# Patient Record
Sex: FEMALE | Race: WHITE | NOT HISPANIC OR LATINO | Employment: FULL TIME | URBAN - METROPOLITAN AREA
[De-identification: names, ages, dates, MRNs, and addresses within clinical notes are randomized per-mention and may not be internally consistent; named-entity substitution may affect disease eponyms.]

---

## 2021-11-23 ENCOUNTER — OFFICE VISIT (OUTPATIENT)
Dept: NEUROLOGY | Facility: CLINIC | Age: 41
End: 2021-11-23
Payer: COMMERCIAL

## 2021-11-23 VITALS
DIASTOLIC BLOOD PRESSURE: 105 MMHG | TEMPERATURE: 98 F | SYSTOLIC BLOOD PRESSURE: 155 MMHG | RESPIRATION RATE: 17 BRPM | HEIGHT: 63 IN | WEIGHT: 179.44 LBS | HEART RATE: 77 BPM | BODY MASS INDEX: 31.79 KG/M2

## 2021-11-23 DIAGNOSIS — M79.18 MYOFASCIAL PAIN: ICD-10-CM

## 2021-11-23 DIAGNOSIS — G50.0 TRIGEMINAL NEURALGIA OF LEFT SIDE OF FACE: Primary | ICD-10-CM

## 2021-11-23 DIAGNOSIS — R20.2 LEFT HAND PARESTHESIA: ICD-10-CM

## 2021-11-23 DIAGNOSIS — G43.009 MIGRAINE WITHOUT AURA AND WITHOUT STATUS MIGRAINOSUS, NOT INTRACTABLE: ICD-10-CM

## 2021-11-23 DIAGNOSIS — G44.041 INTRACTABLE CHRONIC PAROXYSMAL HEMICRANIA: ICD-10-CM

## 2021-11-23 PROCEDURE — 99999 PR PBB SHADOW E&M-NEW PATIENT-LVL IV: CPT | Mod: PBBFAC,,, | Performed by: PHYSICIAN ASSISTANT

## 2021-11-23 PROCEDURE — 99205 PR OFFICE/OUTPT VISIT, NEW, LEVL V, 60-74 MIN: ICD-10-PCS | Mod: S$GLB,,, | Performed by: PHYSICIAN ASSISTANT

## 2021-11-23 PROCEDURE — 99999 PR PBB SHADOW E&M-NEW PATIENT-LVL IV: ICD-10-PCS | Mod: PBBFAC,,, | Performed by: PHYSICIAN ASSISTANT

## 2021-11-23 PROCEDURE — 99205 OFFICE O/P NEW HI 60 MIN: CPT | Mod: S$GLB,,, | Performed by: PHYSICIAN ASSISTANT

## 2021-11-23 RX ORDER — CARBAMAZEPINE 200 MG/1
200 TABLET ORAL 2 TIMES DAILY
COMMUNITY
Start: 2021-11-15

## 2021-11-23 RX ORDER — IBUPROFEN 600 MG/1
600 TABLET ORAL
COMMUNITY

## 2021-11-23 RX ORDER — FLUTICASONE PROPIONATE 50 MCG
1 SPRAY, SUSPENSION (ML) NASAL
COMMUNITY

## 2021-11-23 RX ORDER — GALCANEZUMAB 120 MG/ML
INJECTION, SOLUTION SUBCUTANEOUS
COMMUNITY
Start: 2021-06-09 | End: 2022-01-11 | Stop reason: ALTCHOICE

## 2021-11-23 RX ORDER — INDOMETHACIN 25 MG/1
25 CAPSULE ORAL 3 TIMES DAILY
Qty: 90 CAPSULE | Refills: 4 | Status: SHIPPED | OUTPATIENT
Start: 2021-11-23 | End: 2022-01-11 | Stop reason: SDUPTHER

## 2022-01-10 NOTE — PROGRESS NOTES
[facility]  [address] [facility] [address]  [place] [address]  Phone:[phone]  Fax: [phone]  Follow up visit     Patient Name: Mira Cantu  : 1980  MRN:  [id number]  Today: 1/10/2022   LOV: 2021  chief complaint: Headache    PCP: Michoacano Flores DO.    Assessment:   Mira Cantu is a 41 y.o.  with a PMHx of: HTN, neck pain (has had interventional pain management treatment in past), Trigeminal neuralgia, asthma, and migraines whom presents solo at the request of the PCP for HA/TN.     HA appear to be mixed trigeminal neuralgia and (based on Unilateral symptoms on LEFT), also appear to be hemicrania continua ( her HA have improved with indocin---however--did not fully take away her HA--also been on steroids d/t recurrent infections---unclear if this also factored into her HA complaints?). Will give indocin more time to work, however, appears to have had a positive effect on her complaints, thus lending diagnosis of hemicrania continua. Ultimately would like to see if we can try other alternatives as opposed to chronic indocin use (she agreed).  Will reassess and see if trend prevails once the steroids are completed. As she is not having side effects (GI/) will continue indocin.          -regarding her TN symptoms, she is unclear if she has ever had vascular imaging (I.e., to look for artery touching the CN V). She would like to hold off and try medicines and if continued concern, consider getting MRA. <---historically (states today there was improvement).     Neck pain persists... no reports of sensory change in LUE at today's visit.          Review:    ICD-10-CM ICD-9-CM   1. Intractable chronic paroxysmal hemicrania  G44.041 339.04   2. Trigeminal neuralgia of left side of face  G50.0 350.1   3. Hemicrania continua  G44.51 339.41         Plan:               -will hold off on imaging for right now, but may benefit from MRA head (possibly MRI Brain) and  "MRI C spine     For HA Prevention:  1 emgality was self stopped, may revisit in future  2 continue tegretol   3 continue indocin for now, TID, refills given   3 we discussed botox at length, for now, would like to hold off, she will let us know if she would like to retry (only tried one session "years ago" and had "aches after receiving")  4 added pamelor, discussed adv effects/dosing, self titration to 20 mg Q2h before bed over next few weeks, she voiced understanding and would like to move forward.                 All test results as well as any necessary instructions were reviewed and discussed with patient.    Review:  Orders Placed This Encounter    nortriptyline (PAMELOR) 10 MG capsule    indomethacin (INDOCIN) 25 MG capsule         Patient to return to PCP/other specialists for all other problems  Patient to continue on all medications as Rx'd   A detailed AVS was provided to the patient with patient readback   RTO- 2 months to check in   The patient indicates understanding of these issues and agrees to the plan.    HPI:   Mira Cantu is a 41 y.o.right handed, female with a PMHx of: HTN, neck pain (has had interventional pain management treatment in past), Trigeminal neuralgia, asthma, and migraines whom presents solo in follow up for HA/TN.     At last visit:She was to continue emgality, tegretol and try indocin.    Has had off/on infections for past 2 months, treated with ABX and about to finish steroids   The indocin worked, no longer having pain, able to move her eye brows more   Pain on LEFT side of head  "Much more manageable" with indocin  No side effects   Pain still 3-4/10   " a few migraines since last seen"  Overall doing a lot better   Currently on prilosec from PCP to protect her stomach   No other new concerns   When asked, sleeps well   Has been off emgality for a few months now, Rx seems to have lapsed       -she seemed to think it helped her HA, but ultimately, the best help has " "been indocin.   Still having some neck/shoulder pain, doesn't mention any sensory changes in her hands at today's appt       HA historically:   Start:pain since 2008, tegretol was helping (after multiple different medicines/different providers trying)  History of trauma (no), History of CNS infection (no), History of Stroke (no)  Location:LEFT frontalis/LEFT side of head/LEFT ear, sometimes to the LEFT side of the mouth, LEFT side of the nose            Migraines: same region (on the LEFT), climb from the back of the head on the LEFT, and goes up/around to the back of the LEFT eye (eye red on the LEFT, calhoun constantly, nasal dripping, lasts many hours and can last for days--al on the LEFT)                -HA "Better" since being on emgality, still having some facial pain daily   Severity: range: 4-10/10  Duration:all day long   Frequency:30/30 HD  How many HA types do you have (?):  Associated factors (bolded positive) WITH HA ( or migraine): Nausea, vomiting, photophobia, phonophobia, tinnitus, scalp pain, vision loss, diplopia, scintillations, eye pain, jaw pain, weakness?    Tried:tegretol, emgality---PCP has switched to trileptal, "I was on this a few days, and had to get off of it as I felt my pain was so much worse!"   Triggers (in bold): stress, lack of sleep, too much caffeine, too little caffeine, chewing, swallowing, gentle touch, cold air, wind touching face, talking does make pain worse,  weather change, seasonal change, strong odours, bright lights, sunlight, food    Last HA: has one presently   Positives in bold: Hx of Kidney Stones, asthma, GI bleed, osteoporosis, CAD/MI, CVA/TIA, DM    Imaging on file: none in [facility], from chart review, imaging studies were reported as normal-(patient states Brain and C spine both reported as normal, though been many years)  Therapies tried in past: (failures to be marked, if known---why did it fail?)  emgality  Trileptal  Tegretol " "  topamax  Botox  Neurontin  Lyrica  oxycodone   percocet  trigger point injections  Indocin       Dr Dale's notes (4/2/20-Care everywhere):  "Patient ID: Mira Cantu is a 39 y.o. female.  The patient is here for follow-up after her initial visit in July 2019. She was supposed to return in 3 months. Her appointments have also been delayed in the past. She is still taking low-dose Tegretol. Liver functions, sodium level, white blood cell count and carbamazepine level are unremarkable, when last checked in December. Previous visits we discussed her MRI results. She had unremarkable brain MRI in November 2008 and April 2017. She also had cervical MRIs done in November 2008 in January 2011, which showed some mild degenerative changes. She is been doing well. She likes the Emgality for chronic migraines. However, she has had issues getting it with her insurance company, despite filling out significant amounts of paperwork. She has failed Topamax in the past.    Patient was originally referred by her otolaryngologist for neurologic consultation regarding trigeminal neuralgia. Approximately 10 years ago, the patient had sudden onset of left-sided facial pain. She said the pain was so severe that she was hospitalized for approximately a week. Testing at that time was unremarkable. Over a period of approximately 3 years, she saw numerous physicians in [place], [place] and [place]. She was treated with numerous medications including Botox, Neurontin, Lyrica, oxycodone and trigger point injections. At this point, she is on carbamazepine 200 mg twice daily. She does not want to take narcotics because they did not seem to help. She has been off of those for about a year and a half. Spinal stimulator was suggested by one individual but not felt to be needed by another. She has had neck MRIs and brain MRIs. She believes her most recent brain MRI was in 2017. She is not sure of the neck MRI day. She does describe " "some constant pain involving the left side the face. This also goes down to the neck. She feels like her left hand is weak and she drops things. There is numbness of all fingers on the left hand. It does not affect the leg. She has never had an EMG nerve conduction study.   HPI  Patient's medications, allergies, past medical, surgical, social and family histories were reviewed and updated as appropriate. "      As aside, long standing numbness in the LUE manus, at times now dropping things (commented by previous neurologist). Denies incontinence, falls. States she has a lot of neck pain. "I have had imaging studies and blocks from pain management, nothing seems to have worked."       Medication Reconciliation:   Current Outpatient Medications   Medication Sig Dispense Refill    carBAMazepine (TEGRETOL) 200 mg tablet Take 200 mg by mouth 2 (two) times daily.      fluticasone propionate (FLONASE) 50 mcg/actuation nasal spray 1 spray by Nasal route.      ibuprofen (ADVIL,MOTRIN) 600 MG tablet Take 600 mg by mouth.      indomethacin (INDOCIN) 25 MG capsule Take 1 capsule (25 mg total) by mouth 3 (three) times daily. 90 capsule 4    predniSONE (DELTASONE) 10 MG tablet Take by mouth.      VITAMIN D2 1,250 mcg (50,000 unit) capsule Take 50,000 Units by mouth twice a week.       No current facility-administered medications for this visit.     Review of patient's allergies indicates:   Allergen Reactions    Iodinated contrast media Anaphylaxis    Codeine      Other reaction(s): Other (See Comments)  hyperactivity    Iohexol     Morphine      Other reaction(s): Other (See Comments)  Made me "wildy hyper"      Penicillins      Other reaction(s): Unknown  Per allergy testing         PMHx:right leg-orthopedic surgery (6 years old), asthma, HA, TN,   Past Medical History:   Diagnosis Date    Asthma     Headache     Trigeminal neuralgia      Past Surgical History:   Procedure Laterality Date    right leg surgery   " "   she was 6 years old, accident requiring the leg to be "reattached"        Fhx:mom-leukemia, father: heart disease   Family History   Problem Relation Age of Onset    Leukemia Mother     Heart disease Father        Shx: no tobacco, no recreational drugs, works in [employer]-at [employer]   Social History     Socioeconomic History    Marital status: Single   Tobacco Use    Smoking status: Never Smoker    Smokeless tobacco: Never Used           Labs:   Reviewed in chart    Imaging:   Reviewed in chart       Other testing:  Reviewed in chart     Note: I have independently reviewed any/all imaging/labs/tests and agree with the report (s) as documented.  Any discrepancies will be as noted/demarcated by free text.  NOEL EDWARD 1/10/2022                     ROS:   Review Of Systems (questions asked, positive or additions in BOLD)  Gen: Weight change, fatigue/malaise, pyrexia   HEENT: Tinnitus, headache,  blurred vision, eye pain, diplopia, photophobia,  nose bleeds, congestion, sore throat, jaw pain, scalp pain, neck stiffness   Card: Palpitations, CP   Pulm: SOB, cough   Vas: Easy bruising, easy bleeding   GI: N/V/D/C, incontinence, hematemesis, hematochezia    : incontinence, hematuria   Endocrine: Temp intolerance, polyuria, polydipsia   M/S: Neck pain, myalgia, back pain, joint pain, falls    Neuro: PER HPI   PSY: Memory loss, confusion, depression, anxiety, trouble in sleep          EXAM:   BP (!) 142/104 (BP Location: Left arm, Patient Position: Sitting, BP Method: Medium (Automatic))   Pulse 79   Temp 97.9 °F (36.6 °C) (Temporal)   Resp 17   Ht 5' 3" (1.6 m)   Wt 84.1 kg (185 lb 6.5 oz)   BMI 32.84 kg/m²    GEN:  NAD   HEENT: NC/AT      EXTREM:   no edema present.    NEURO:  Mental Status:  Awake, alert and appropriately oriented to time, place, and person.  Normal attention and concentration.  Speech is fluent and appropriate language function (I.e., comprehension)    Cranial Nerves:     " Extraocular movements are intact and without nystagmus.  Visual fields are full to confrontation testing.  Facial movement is symmetric above the mask line.  Hearing is normal FROM of neck in all (6) directions, shoulder shrug symmetrical. Tongue in midline without fasiculation.     Motor:  Antigravity in all limbs, no drift          Gait and Stance: Normal manner of stance and gait function testing.         This document has been electronically signed by Delroy Romero MPA, PA-C on 1/10/2022, I have personally typed this message using the EMR.       Dr Golden MD was available during today's visit.     Personal Protective Equipment:    Personal Protective Equipment was used during this encounter including: face shield, KN95/cloth mask and non latex gloves.          CC: Michoacano Flores, DO

## 2022-01-11 ENCOUNTER — OFFICE VISIT (OUTPATIENT)
Dept: NEUROLOGY | Facility: CLINIC | Age: 42
End: 2022-01-11
Payer: COMMERCIAL

## 2022-01-11 VITALS
SYSTOLIC BLOOD PRESSURE: 142 MMHG | DIASTOLIC BLOOD PRESSURE: 104 MMHG | BODY MASS INDEX: 32.86 KG/M2 | HEART RATE: 79 BPM | RESPIRATION RATE: 17 BRPM | HEIGHT: 63 IN | WEIGHT: 185.44 LBS | TEMPERATURE: 98 F

## 2022-01-11 DIAGNOSIS — G50.0 TRIGEMINAL NEURALGIA OF LEFT SIDE OF FACE: ICD-10-CM

## 2022-01-11 DIAGNOSIS — G44.51 HEMICRANIA CONTINUA: ICD-10-CM

## 2022-01-11 DIAGNOSIS — G44.041 INTRACTABLE CHRONIC PAROXYSMAL HEMICRANIA: Primary | ICD-10-CM

## 2022-01-11 PROCEDURE — 99214 PR OFFICE/OUTPT VISIT, EST, LEVL IV, 30-39 MIN: ICD-10-PCS | Mod: S$GLB,,, | Performed by: PHYSICIAN ASSISTANT

## 2022-01-11 PROCEDURE — 3077F SYST BP >= 140 MM HG: CPT | Mod: CPTII,S$GLB,, | Performed by: PHYSICIAN ASSISTANT

## 2022-01-11 PROCEDURE — 3008F PR BODY MASS INDEX (BMI) DOCUMENTED: ICD-10-PCS | Mod: CPTII,S$GLB,, | Performed by: PHYSICIAN ASSISTANT

## 2022-01-11 PROCEDURE — 1160F RVW MEDS BY RX/DR IN RCRD: CPT | Mod: CPTII,S$GLB,, | Performed by: PHYSICIAN ASSISTANT

## 2022-01-11 PROCEDURE — 1159F MED LIST DOCD IN RCRD: CPT | Mod: CPTII,S$GLB,, | Performed by: PHYSICIAN ASSISTANT

## 2022-01-11 PROCEDURE — 99214 OFFICE O/P EST MOD 30 MIN: CPT | Mod: S$GLB,,, | Performed by: PHYSICIAN ASSISTANT

## 2022-01-11 PROCEDURE — 99999 PR PBB SHADOW E&M-EST. PATIENT-LVL IV: CPT | Mod: PBBFAC,,, | Performed by: PHYSICIAN ASSISTANT

## 2022-01-11 PROCEDURE — 3080F PR MOST RECENT DIASTOLIC BLOOD PRESSURE >= 90 MM HG: ICD-10-PCS | Mod: CPTII,S$GLB,, | Performed by: PHYSICIAN ASSISTANT

## 2022-01-11 PROCEDURE — 1160F PR REVIEW ALL MEDS BY PRESCRIBER/CLIN PHARMACIST DOCUMENTED: ICD-10-PCS | Mod: CPTII,S$GLB,, | Performed by: PHYSICIAN ASSISTANT

## 2022-01-11 PROCEDURE — 3008F BODY MASS INDEX DOCD: CPT | Mod: CPTII,S$GLB,, | Performed by: PHYSICIAN ASSISTANT

## 2022-01-11 PROCEDURE — 1159F PR MEDICATION LIST DOCUMENTED IN MEDICAL RECORD: ICD-10-PCS | Mod: CPTII,S$GLB,, | Performed by: PHYSICIAN ASSISTANT

## 2022-01-11 PROCEDURE — 3077F PR MOST RECENT SYSTOLIC BLOOD PRESSURE >= 140 MM HG: ICD-10-PCS | Mod: CPTII,S$GLB,, | Performed by: PHYSICIAN ASSISTANT

## 2022-01-11 PROCEDURE — 99999 PR PBB SHADOW E&M-EST. PATIENT-LVL IV: ICD-10-PCS | Mod: PBBFAC,,, | Performed by: PHYSICIAN ASSISTANT

## 2022-01-11 PROCEDURE — 3080F DIAST BP >= 90 MM HG: CPT | Mod: CPTII,S$GLB,, | Performed by: PHYSICIAN ASSISTANT

## 2022-01-11 RX ORDER — PREDNISONE 10 MG/1
TABLET ORAL
COMMUNITY
Start: 2021-12-29 | End: 2022-03-15 | Stop reason: ALTCHOICE

## 2022-01-11 RX ORDER — INDOMETHACIN 25 MG/1
25 CAPSULE ORAL 3 TIMES DAILY
Qty: 90 CAPSULE | Refills: 4 | Status: SHIPPED | OUTPATIENT
Start: 2022-01-11 | End: 2022-03-15 | Stop reason: SDUPTHER

## 2022-01-11 RX ORDER — NORTRIPTYLINE HYDROCHLORIDE 10 MG/1
CAPSULE ORAL
Qty: 30 CAPSULE | Refills: 11 | Status: SHIPPED | OUTPATIENT
Start: 2022-01-11 | End: 2022-03-15 | Stop reason: ALTCHOICE

## 2022-01-11 RX ORDER — ERGOCALCIFEROL 1.25 MG/1
50000 CAPSULE ORAL
COMMUNITY
Start: 2021-12-09

## 2022-01-11 NOTE — PATIENT INSTRUCTIONS
To help reduce the headaches:  Try the pamelor (nortryptilline) 1 pill 2 hours before bed, if after a week, no benefits/no side effects, move up to 2 pills      Continue the indocin now, 3 x a day, goal would be to come off of this and find a different preventative (e.g., botox). We discuss this at length today, if you'd like me to move forward with this plan, please let me know so I can start the process (build in a month-at least)

## 2022-03-14 ENCOUNTER — TELEPHONE (OUTPATIENT)
Dept: NEUROLOGY | Facility: CLINIC | Age: 42
End: 2022-03-14
Payer: COMMERCIAL

## 2022-03-15 ENCOUNTER — OFFICE VISIT (OUTPATIENT)
Dept: NEUROLOGY | Facility: CLINIC | Age: 42
End: 2022-03-15
Payer: COMMERCIAL

## 2022-03-15 VITALS
RESPIRATION RATE: 17 BRPM | BODY MASS INDEX: 33.66 KG/M2 | HEART RATE: 80 BPM | HEIGHT: 63 IN | SYSTOLIC BLOOD PRESSURE: 134 MMHG | DIASTOLIC BLOOD PRESSURE: 94 MMHG | WEIGHT: 189.94 LBS | TEMPERATURE: 98 F

## 2022-03-15 DIAGNOSIS — G50.0 TRIGEMINAL NEURALGIA OF LEFT SIDE OF FACE: ICD-10-CM

## 2022-03-15 DIAGNOSIS — G43.719 INTRACTABLE CHRONIC MIGRAINE WITHOUT AURA AND WITHOUT STATUS MIGRAINOSUS: ICD-10-CM

## 2022-03-15 DIAGNOSIS — G44.041 INTRACTABLE CHRONIC PAROXYSMAL HEMICRANIA: Primary | ICD-10-CM

## 2022-03-15 DIAGNOSIS — G43.009 MIGRAINE WITHOUT AURA AND WITHOUT STATUS MIGRAINOSUS, NOT INTRACTABLE: ICD-10-CM

## 2022-03-15 PROCEDURE — 99999 PR PBB SHADOW E&M-EST. PATIENT-LVL IV: ICD-10-PCS | Mod: PBBFAC,,, | Performed by: PHYSICIAN ASSISTANT

## 2022-03-15 PROCEDURE — 99999 PR PBB SHADOW E&M-EST. PATIENT-LVL IV: CPT | Mod: PBBFAC,,, | Performed by: PHYSICIAN ASSISTANT

## 2022-03-15 PROCEDURE — 1159F MED LIST DOCD IN RCRD: CPT | Mod: CPTII,S$GLB,, | Performed by: PHYSICIAN ASSISTANT

## 2022-03-15 PROCEDURE — 3008F PR BODY MASS INDEX (BMI) DOCUMENTED: ICD-10-PCS | Mod: CPTII,S$GLB,, | Performed by: PHYSICIAN ASSISTANT

## 2022-03-15 PROCEDURE — 3080F PR MOST RECENT DIASTOLIC BLOOD PRESSURE >= 90 MM HG: ICD-10-PCS | Mod: CPTII,S$GLB,, | Performed by: PHYSICIAN ASSISTANT

## 2022-03-15 PROCEDURE — 1160F PR REVIEW ALL MEDS BY PRESCRIBER/CLIN PHARMACIST DOCUMENTED: ICD-10-PCS | Mod: CPTII,S$GLB,, | Performed by: PHYSICIAN ASSISTANT

## 2022-03-15 PROCEDURE — 99214 OFFICE O/P EST MOD 30 MIN: CPT | Mod: S$GLB,,, | Performed by: PHYSICIAN ASSISTANT

## 2022-03-15 PROCEDURE — 3008F BODY MASS INDEX DOCD: CPT | Mod: CPTII,S$GLB,, | Performed by: PHYSICIAN ASSISTANT

## 2022-03-15 PROCEDURE — 3080F DIAST BP >= 90 MM HG: CPT | Mod: CPTII,S$GLB,, | Performed by: PHYSICIAN ASSISTANT

## 2022-03-15 PROCEDURE — 3075F SYST BP GE 130 - 139MM HG: CPT | Mod: CPTII,S$GLB,, | Performed by: PHYSICIAN ASSISTANT

## 2022-03-15 PROCEDURE — 3075F PR MOST RECENT SYSTOLIC BLOOD PRESS GE 130-139MM HG: ICD-10-PCS | Mod: CPTII,S$GLB,, | Performed by: PHYSICIAN ASSISTANT

## 2022-03-15 PROCEDURE — 99214 PR OFFICE/OUTPT VISIT, EST, LEVL IV, 30-39 MIN: ICD-10-PCS | Mod: S$GLB,,, | Performed by: PHYSICIAN ASSISTANT

## 2022-03-15 PROCEDURE — 1160F RVW MEDS BY RX/DR IN RCRD: CPT | Mod: CPTII,S$GLB,, | Performed by: PHYSICIAN ASSISTANT

## 2022-03-15 PROCEDURE — 1159F PR MEDICATION LIST DOCUMENTED IN MEDICAL RECORD: ICD-10-PCS | Mod: CPTII,S$GLB,, | Performed by: PHYSICIAN ASSISTANT

## 2022-03-15 RX ORDER — AMITRIPTYLINE HYDROCHLORIDE 10 MG/1
TABLET, FILM COATED ORAL
Qty: 30 TABLET | Refills: 4 | Status: SHIPPED | OUTPATIENT
Start: 2022-03-15

## 2022-03-15 RX ORDER — INDOMETHACIN 25 MG/1
25 CAPSULE ORAL 3 TIMES DAILY
Qty: 90 CAPSULE | Refills: 4 | Status: SHIPPED | OUTPATIENT
Start: 2022-03-15

## 2022-03-15 NOTE — PROGRESS NOTES
"  [facility]  [address] [facility] [address]  [place] [address]  Phone:[phone]  Fax: [phone]  Follow up visit     Patient Name: Mira Cantu  : 1980  MRN:  [id number]  Today: 3/15/2022   LOV: 2022  chief complaint: Headache    PCP: Michoacano Flores DO.    Assessment:   Mira Cantu is a 41 y.o.  with a PMHx of: HTN, neck pain (has had interventional pain management treatment in past), Trigeminal neuralgia, asthma, and migraines whom presents solo in follow up  for HA/TN.     HA appear to be mixed trigeminal neuralgia and (based on Unilateral symptoms on LEFT), also appear to be hemicrania continua ( her HA have improved with indocin---however--did not fully take away her HA--also been on steroids d/t recurrent infections---unclear if this also factored into her HA complaints?).  Indocin is "helping" however not fully relieving HA. "If I wasn't on indocin, I would be in more pain." There is likely a chronic/intractable migrainous component to her complaints.        -regarding her TN symptoms, she is unclear if she has ever had vascular imaging (I.e., to look for artery touching the CN V). She would like to hold off and try medicines and if continued concern, consider getting MRA. <---historically (states today there was improvement).     Neck pain persists... no reports of sensory change in LUE at today's visit.          Review:    ICD-10-CM ICD-9-CM   1. Intractable chronic paroxysmal hemicrania  G44.041 339.04   2. Trigeminal neuralgia of left side of face  G50.0 350.1   3. Migraine without aura and without status migrainosus, not intractable  G43.009 346.10   4. Intractable chronic migraine without aura and without status migrainosus  G43.719 346.71         Plan:               -will hold off on imaging for right now, but may benefit from MRA head (possibly MRI Brain) and MRI C spine     For HA Prevention:   1 continue tegretol    2 continue indocin for " now, TID, refills given    3. Stop pamelor, try elavil instead, discussed adv effects/dosing, she agreed (10 mg Q2h before bed)   4. Patient meets the criteria for chronic migraine. In summary, She has headaches/migraines >15 days per month  and last >4 hours if untreated. Specifics of duration, frequency and strength are listed in the HPI (please refer to this section).  This pattern has continued for >3 months.  She has failed at least three preventive medications (full list of medications is listed below in the HPI under Therapies tried in past)  I have therefore recommended a trial of Botox via the PREEMPT protocol for migraine prophylaxis.   We discussed procedure day at length (e.g., no NSAIDs or ASA), wear old/loose fitting clothing, no make up, eat meals/stay well hydrated and secure a ride if necessary. Also, discussed it can take up to 2 sessions of botox to get results desired. Lastly, we discussed procedure at length, 31 injections done in the office, potential complications not limited to muscle weakness, respiratory issues, or worst case scenario-death. The patient voiced understanding and wished to move forward.  Muscles to be injected:   10 units divided in 2 sites  Procerus 5 units in 1 site  Frontalis 20 units divided in 4 sites  Temporalis 40 units divided in 8 sites  Occipitalis 30 units divided in 6 sites  Cervical paraspinal 20 units divided in 4 sites  Trapezius 30 units divided in 6 sites  A total dose of 155 units of botox to be used with  45 units to be discarded/wasted (unavoidable).          -she will call insurance company and ask about copay (in writing)         -assuming botox successful in reducing HA, will THEN try to wean her off some of her meds (she agreed)                  All test results as well as any necessary instructions were reviewed and discussed with patient.    Review:  Orders Placed This Encounter    Prior authorization Order    amitriptyline (ELAVIL) 10 MG  "tablet    indomethacin (INDOCIN) 25 MG capsule         Patient to return to PCP/other specialists for all other problems  Patient to continue on all medications as Rx'd  An AVS is available online (along with this note)---in secured patient portal   RTO- botox 1   The patient indicates understanding of these issues and agrees to the plan.    HPI:   Mira Cantu is a 41 y.o.right handed, female with a PMHx of: HTN, neck pain (has had interventional pain management treatment in past), Trigeminal neuralgia, asthma, and migraines whom presents solo in follow up for HA/TN.     At last visit:  Continue tegretol, indocin and started pamelor.   pamelor made her confused but it helped with HA\      -"I stopped that 2 weeks ago and still feel less sharp"  30/30 HD, all day long (24/7), average pain: 6-7/10  Location LEFT hemicrania and sometimes can progress to other areas in the head       From previous visits:  She was to continue emgality, tegretol and try indocin.    Has had off/on infections for past 2 months, treated with ABX and about to finish steroids   The indocin worked, no longer having pain, able to move her eye brows more   Pain on LEFT side of head  "Much more manageable" with indocin  No side effects   Pain still 3-4/10   " a few migraines since last seen"  Overall doing a lot better   Currently on prilosec from PCP to protect her stomach   No other new concerns   When asked, sleeps well   Has been off emgality for a few months now, Rx seems to have lapsed       -she seemed to think it helped her HA, but ultimately, the best help has been indocin.   Still having some neck/shoulder pain, doesn't mention any sensory changes in her hands at today's appt       HA historically:   Start:pain since 2008, tegretol was helping (after multiple different medicines/different providers trying)  History of trauma (no), History of CNS infection (no), History of Stroke (no)  Location:LEFT frontalis/LEFT side of " "head/LEFT ear, sometimes to the LEFT side of the mouth, LEFT side of the nose            Migraines: same region (on the LEFT), climb from the back of the head on the LEFT, and goes up/around to the back of the LEFT eye (eye red on the LEFT, calhoun constantly, nasal dripping, lasts many hours and can last for days--al on the LEFT)                -HA "Better" since being on emgality, still having some facial pain daily   Severity: range: 4-10/10  Duration:all day long   Frequency:30/30 HD  How many HA types do you have (?):  Associated factors (bolded positive) WITH HA ( or migraine): Nausea, vomiting, photophobia, phonophobia, tinnitus, scalp pain, vision loss, diplopia, scintillations, eye pain, jaw pain, weakness?    Tried:tegretol, emgality---PCP has switched to trileptal, "I was on this a few days, and had to get off of it as I felt my pain was so much worse!"   Triggers (in bold): stress, lack of sleep, too much caffeine, too little caffeine, chewing, swallowing, gentle touch, cold air, wind touching face, talking does make pain worse,  weather change, seasonal change, strong odours, bright lights, sunlight, food    Last HA: has one presently   Positives in bold: Hx of Kidney Stones, asthma, GI bleed, osteoporosis, CAD/MI, CVA/TIA, DM    Imaging on file: none in [facility] E[facility], from chart review, imaging studies were reported as normal-(patient states Brain and C spine both reported as normal, though been many years)  Therapies tried in past: (failures to be marked, if known---why did it fail?)  emgality  Trileptal  Tegretol   topamax  Botox  Neurontin  Lyrica  oxycodone   percocet  trigger point injections  Indocin   pamelor       Dr Dale's notes (4/2/20-Care everywhere):  "Patient ID: Mira Cantu is a 39 y.o. female.  The patient is here for follow-up after her initial visit in July 2019. She was supposed to return in 3 months. Her appointments have also been delayed in the past. She is still taking " low-dose Tegretol. Liver functions, sodium level, white blood cell count and carbamazepine level are unremarkable, when last checked in December. Previous visits we discussed her MRI results. She had unremarkable brain MRI in November 2008 and April 2017. She also had cervical MRIs done in November 2008 in January 2011, which showed some mild degenerative changes. She is been doing well. She likes the Emgality for chronic migraines. However, she has had issues getting it with her insurance company, despite filling out significant amounts of paperwork. She has failed Topamax in the past.    Patient was originally referred by her otolaryngologist for neurologic consultation regarding trigeminal neuralgia. Approximately 10 years ago, the patient had sudden onset of left-sided facial pain. She said the pain was so severe that she was hospitalized for approximately a week. Testing at that time was unremarkable. Over a period of approximately 3 years, she saw numerous physicians in [place], [place] and [place]. She was treated with numerous medications including Botox, Neurontin, Lyrica, oxycodone and trigger point injections. At this point, she is on carbamazepine 200 mg twice daily. She does not want to take narcotics because they did not seem to help. She has been off of those for about a year and a half. Spinal stimulator was suggested by one individual but not felt to be needed by another. She has had neck MRIs and brain MRIs. She believes her most recent brain MRI was in 2017. She is not sure of the neck MRI day. She does describe some constant pain involving the left side the face. This also goes down to the neck. She feels like her left hand is weak and she drops things. There is numbness of all fingers on the left hand. It does not affect the leg. She has never had an EMG nerve conduction study.   HPI  Patient's medications, allergies, past medical, surgical, social and family histories were reviewed and  "updated as appropriate. "      As aside, long standing numbness in the LUE manus, at times now dropping things (commented by previous neurologist). Denies incontinence, falls. States she has a lot of neck pain. "I have had imaging studies and blocks from pain management, nothing seems to have worked."       Medication Reconciliation:   Current Outpatient Medications   Medication Sig Dispense Refill    carBAMazepine (TEGRETOL) 200 mg tablet Take 200 mg by mouth 2 (two) times daily.      fluticasone propionate (FLONASE) 50 mcg/actuation nasal spray 1 spray by Nasal route.      ibuprofen (ADVIL,MOTRIN) 600 MG tablet Take 600 mg by mouth.      omeprazole magnesium (PRILOSEC ORAL) Take by mouth.      VITAMIN D2 1,250 mcg (50,000 unit) capsule Take 50,000 Units by mouth twice a week.      amitriptyline (ELAVIL) 10 MG tablet 1 pill 2 hours before bed 30 tablet 4    indomethacin (INDOCIN) 25 MG capsule Take 1 capsule (25 mg total) by mouth 3 (three) times daily. 90 capsule 4     No current facility-administered medications for this visit.     Review of patient's allergies indicates:   Allergen Reactions    Iodinated contrast media Anaphylaxis    Codeine      Other reaction(s): Other (See Comments)  hyperactivity    Iohexol     Morphine      Other reaction(s): Other (See Comments)  Made me "wildy hyper"      Penicillins      Other reaction(s): Unknown  Per allergy testing         PMHx:right leg-orthopedic surgery (6 years old), asthma, HA, TN,   Past Medical History:   Diagnosis Date    Asthma     Headache     Trigeminal neuralgia      Past Surgical History:   Procedure Laterality Date    right leg surgery      she was 6 years old, accident requiring the leg to be "reattached"        Fhx:mom-leukemia, father: heart disease   Family History   Problem Relation Age of Onset    Leukemia Mother     Heart disease Father        Shx: no tobacco, no recreational drugs, works in community resources-at local [employer] " "  Social History     Socioeconomic History    Marital status: Single   Tobacco Use    Smoking status: Never Smoker    Smokeless tobacco: Never Used           Labs:   Reviewed in chart    Imaging:   Reviewed in chart       Other testing:  Reviewed in chart     Note: I have independently reviewed any/all imaging/labs/tests and agree with the report (s) as documented.  Any discrepancies will be as noted/demarcated by free text.  NOEL EDWARD 3/15/2022                     ROS:   Review Of Systems (questions asked, positive or additions in BOLD)  Gen: Weight change, fatigue/malaise, pyrexia   HEENT: Tinnitus, headache,  blurred vision, eye pain, diplopia, photophobia,  nose bleeds, congestion, sore throat, jaw pain, scalp pain, neck stiffness   Card: Palpitations, CP   Pulm: SOB, cough   Vas: Easy bruising, easy bleeding   GI: N/V/D/C, incontinence, hematemesis, hematochezia    : incontinence, hematuria   Endocrine: Temp intolerance, polyuria, polydipsia   M/S: Neck pain, myalgia, back pain, joint pain, falls    Neuro: PER HPI   PSY: Memory loss, confusion, depression, anxiety, trouble in sleep          EXAM:   BP (!) 134/94 (BP Location: Right arm, Patient Position: Sitting, BP Method: Medium (Automatic))   Pulse 80   Temp 97.5 °F (36.4 °C) (Temporal)   Resp 17   Ht 5' 3" (1.6 m)   Wt 86.1 kg (189 lb 14.8 oz)   BMI 33.64 kg/m²    GEN:  NAD   HEENT: NC/AT      EXTREM:   no edema present.    NEURO:  Mental Status:  Awake, alert and appropriately oriented to time, place, and person.  Normal attention and concentration.  Speech is fluent and appropriate language function (I.e., comprehension). Knew current and past 3 POTUS.     Cranial Nerves:     Extraocular movements are intact and without nystagmus.  Visual fields are full to confrontation testing.  Facial movement is symmetric above the mask line.  Hearing is normal FROM of neck in all (6) directions, shoulder shrug symmetrical. Tongue in midline without " fasiculation.     Motor:  Antigravity in all limbs, no drift          Gait and Stance: Normal manner of stance and gait function testing.         This document has been electronically signed by Delroy Romero MPA, PA-C on 3/15/2022, I have personally typed this message using the EMR.       Dr Golden MD was available during today's visit.     Personal Protective Equipment:    Personal Protective Equipment was used during this encounter including: KN95 and non latex gloves.          CC: Michoacano Flores DO